# Patient Record
Sex: MALE | Race: WHITE | NOT HISPANIC OR LATINO | URBAN - METROPOLITAN AREA
[De-identification: names, ages, dates, MRNs, and addresses within clinical notes are randomized per-mention and may not be internally consistent; named-entity substitution may affect disease eponyms.]

---

## 2022-04-21 ENCOUNTER — OFFICE VISIT (OUTPATIENT)
Dept: URBAN - METROPOLITAN AREA CLINIC 68 | Facility: CLINIC | Age: 62
End: 2022-04-21

## 2022-06-04 ENCOUNTER — TELEPHONE ENCOUNTER (OUTPATIENT)
Dept: URBAN - METROPOLITAN AREA CLINIC 68 | Facility: CLINIC | Age: 62
End: 2022-06-04

## 2022-06-04 RX ORDER — SITAGLIPTIN AND METFORMIN HYDROCHLORIDE 50; 500 MG/1; MG/1
JANUMET( 50-500MG ORAL   ) UNDEFINED -HX ENTRY TABLET, FILM COATED ORAL
OUTPATIENT
Start: 2015-07-24

## 2022-06-04 RX ORDER — POLYETHYLENE GLYCOL 3350, SODIUM SULFATE, SODIUM CHLORIDE, POTASSIUM CHLORIDE, ASCORBIC ACID, SODIUM ASCORBATE 7.5-2.691G
KIT ORAL AS DIRECTED
Qty: 1 | Refills: 0 | OUTPATIENT
Start: 2012-07-11 | End: 2012-07-12

## 2022-06-04 RX ORDER — CANAGLIFLOZIN 300 MG/1
INVOKANA( 300MG ORAL   ) UNDEFINED -HX ENTRY TABLET, FILM COATED ORAL
OUTPATIENT
Start: 2015-07-24

## 2022-06-04 RX ORDER — LOSARTAN POTASSIUM 25 MG/1
LOSARTAN POTASSIUM( 25MG ORAL   ) UNDEFINED -HX ENTRY TABLET ORAL
OUTPATIENT
Start: 2015-07-24

## 2022-06-04 RX ORDER — LEVOTHYROXINE SODIUM 125 UG/1
TABLET ORAL
Qty: 30 | Refills: 0 | OUTPATIENT
Start: 2017-04-05 | End: 2017-05-05

## 2022-06-04 RX ORDER — FENOFIBRATE 134 MG/1
FENOFIBRATE MICRONIZED( 134MG ORAL   ) UNDEFINED -HX ENTRY CAPSULE ORAL
OUTPATIENT
Start: 2017-03-23

## 2022-06-04 RX ORDER — LEVOTHYROXINE SODIUM 125 UG/1
SYNTHROID( 125MCG ORAL   ) UNDEFINED -HX ENTRY TABLET ORAL
OUTPATIENT
Start: 2017-03-23

## 2022-06-05 ENCOUNTER — TELEPHONE ENCOUNTER (OUTPATIENT)
Dept: URBAN - METROPOLITAN AREA CLINIC 68 | Facility: CLINIC | Age: 62
End: 2022-06-05

## 2022-06-05 RX ORDER — SITAGLIPTIN AND METFORMIN HYDROCHLORIDE 50; 500 MG/1; MG/1
JANUMET( 50-500MG ORAL 1 TWO TIMES DAILY ) ACTIVE -HX ENTRY TABLET, FILM COATED ORAL
Status: ACTIVE | COMMUNITY
Start: 2017-04-05

## 2022-06-05 RX ORDER — CANAGLIFLOZIN 300 MG/1
INVOKANA( 300MG ORAL  DAILY ) ACTIVE -HX ENTRY TABLET, FILM COATED ORAL DAILY
Status: ACTIVE | COMMUNITY
Start: 2017-04-05

## 2022-06-05 RX ORDER — FENOFIBRATE 134 MG/1
FENOFIBRATE( 134MG ORAL  DAILY ) ACTIVE -HX ENTRY CAPSULE ORAL DAILY
Status: ACTIVE | COMMUNITY
Start: 2017-04-05

## 2022-06-05 RX ORDER — LOSARTAN POTASSIUM 25 MG/1
LOSARTAN POTASSIUM( 25MG ORAL  DAILY ) ACTIVE -HX ENTRY TABLET ORAL DAILY
Status: ACTIVE | COMMUNITY
Start: 2017-04-05

## 2022-06-05 RX ORDER — OMEPRAZOLE 20 MG/1
CAPSULE, DELAYED RELEASE ORAL
Qty: 30 | Refills: 30 | Status: ACTIVE | COMMUNITY
Start: 2021-05-09

## 2022-06-05 RX ORDER — UBIDECARENONE/VIT E ACET 100MG-5
VITAMIN D( 2000UNIT ORAL  DAILY ) ACTIVE -HX ENTRY CAPSULE ORAL DAILY
Status: ACTIVE | COMMUNITY
Start: 2017-04-05

## 2022-06-22 ENCOUNTER — OFFICE VISIT (OUTPATIENT)
Dept: URBAN - METROPOLITAN AREA CLINIC 68 | Facility: CLINIC | Age: 62
End: 2022-06-22

## 2022-06-25 ENCOUNTER — TELEPHONE ENCOUNTER (OUTPATIENT)
Age: 62
End: 2022-06-25

## 2022-06-25 RX ORDER — ELECTROLYTES/DEXTROSE
SOLUTION, ORAL ORAL
OUTPATIENT
Start: 2011-05-18 | End: 2017-04-05

## 2022-06-25 RX ORDER — CANAGLIFLOZIN 300 MG/1
INVOKANA( 300MG ORAL   ) UNDEFINED -HX ENTRY TABLET, FILM COATED ORAL
OUTPATIENT
Start: 2015-07-24

## 2022-06-25 RX ORDER — POLYETHYLENE GLYCOL 3350, SODIUM SULFATE, SODIUM CHLORIDE, POTASSIUM CHLORIDE, ASCORBIC ACID, SODIUM ASCORBATE 7.5-2.691G
KIT ORAL AS DIRECTED
Qty: 1 | Refills: 0 | OUTPATIENT
Start: 2012-07-11 | End: 2012-07-12

## 2022-06-25 RX ORDER — LEVOTHYROXINE SODIUM 125 UG/1
TABLET ORAL
Qty: 30 | Refills: 0 | OUTPATIENT
Start: 2017-04-05 | End: 2017-05-05

## 2022-06-25 RX ORDER — LEVOTHYROXINE SODIUM 125 MCG
SYNTHROID( 125MCG ORAL   ) UNDEFINED -HX ENTRY TABLET ORAL
OUTPATIENT
Start: 2017-03-23

## 2022-06-25 RX ORDER — SITAGLIPTIN AND METFORMIN HYDROCHLORIDE 500; 50 MG/1; MG/1
JANUMET( 50-500MG ORAL   ) UNDEFINED -HX ENTRY TABLET, FILM COATED ORAL
OUTPATIENT
Start: 2015-07-24

## 2022-06-25 RX ORDER — POTASSIUM CHLORIDE 750 MG/1
LOSARTAN POTASSIUM(    1 TABLET DAILY ) INACTIVE -HX ENTRY TABLET, EXTENDED RELEASE ORAL
OUTPATIENT
Start: 2011-05-18

## 2022-06-25 RX ORDER — OMEGA-3/DHA/EPA/FISH OIL 1000 MG
CAPSULE ORAL
OUTPATIENT
Start: 2011-05-18 | End: 2017-04-05

## 2022-06-25 RX ORDER — SODIUM SULFATE, POTASSIUM SULFATE, MAGNESIUM SULFATE 17.5; 3.13; 1.6 G/ML; G/ML; G/ML
SOLUTION, CONCENTRATE ORAL AS DIRECTED
Qty: 1 | Refills: 0 | OUTPATIENT
Start: 2017-04-05 | End: 2017-04-06

## 2022-06-25 RX ORDER — FENOFIBRATE 134 MG/1
FENOFIBRATE MICRONIZED( 134MG ORAL   ) UNDEFINED -HX ENTRY CAPSULE ORAL
OUTPATIENT
Start: 2017-03-23

## 2022-06-25 RX ORDER — LOSARTAN POTASSIUM 25 MG/1
LOSARTAN POTASSIUM( 25MG ORAL   ) UNDEFINED -HX ENTRY TABLET, FILM COATED ORAL
OUTPATIENT
Start: 2015-07-24

## 2022-06-25 RX ORDER — POLYETHYLENE GLYCOL 3350, SODIUM SULFATE, SODIUM CHLORIDE, POTASSIUM CHLORIDE, ASCORBIC ACID, SODIUM ASCORBATE 7.5-2.691G
MOVIPREP(    AS DIRECTED ) INACTIVE -HX ENTRY KIT ORAL AS DIRECTED
OUTPATIENT
Start: 2011-05-18

## 2022-06-26 ENCOUNTER — TELEPHONE ENCOUNTER (OUTPATIENT)
Age: 62
End: 2022-06-26

## 2022-06-26 RX ORDER — FENOFIBRATE 134 MG/1
FENOFIBRATE( 134MG ORAL  DAILY ) ACTIVE -HX ENTRY CAPSULE ORAL DAILY
Status: ACTIVE | COMMUNITY
Start: 2017-04-05

## 2022-06-26 RX ORDER — LOSARTAN POTASSIUM 25 MG/1
LOSARTAN POTASSIUM( 25MG ORAL  DAILY ) ACTIVE -HX ENTRY TABLET, FILM COATED ORAL DAILY
Status: ACTIVE | COMMUNITY
Start: 2017-04-05

## 2022-06-26 RX ORDER — OMEPRAZOLE 20 MG/1
CAPSULE, DELAYED RELEASE ORAL
Qty: 30 | Refills: 30 | Status: ACTIVE | COMMUNITY
Start: 2021-05-09

## 2022-06-26 RX ORDER — UBIDECARENONE/VIT E ACET 100MG-5
VITAMIN D( 2000UNIT ORAL  DAILY ) ACTIVE -HX ENTRY CAPSULE ORAL DAILY
Status: ACTIVE | COMMUNITY
Start: 2017-04-05

## 2022-06-26 RX ORDER — SITAGLIPTIN AND METFORMIN HYDROCHLORIDE 500; 50 MG/1; MG/1
JANUMET( 50-500MG ORAL 1 TWO TIMES DAILY ) ACTIVE -HX ENTRY TABLET, FILM COATED ORAL
Status: ACTIVE | COMMUNITY
Start: 2017-04-05

## 2022-06-26 RX ORDER — CANAGLIFLOZIN 300 MG/1
INVOKANA( 300MG ORAL  DAILY ) ACTIVE -HX ENTRY TABLET, FILM COATED ORAL DAILY
Status: ACTIVE | COMMUNITY
Start: 2017-04-05

## 2022-06-26 RX ORDER — ASPIRIN 81 MG/1
TABLET, COATED ORAL
Status: ACTIVE | COMMUNITY
Start: 2011-05-18

## 2022-07-27 ENCOUNTER — OFFICE VISIT (OUTPATIENT)
Dept: URBAN - METROPOLITAN AREA CLINIC 68 | Facility: CLINIC | Age: 62
End: 2022-07-27

## 2022-07-27 ENCOUNTER — DASHBOARD ENCOUNTERS (OUTPATIENT)
Age: 62
End: 2022-07-27

## 2022-07-27 RX ORDER — SOD SULF/POT CHLORIDE/MAG SULF 1.479 G
AS DIRECTED TABLET ORAL ONCE
Qty: 1 KIT | OUTPATIENT
Start: 2022-07-27 | End: 2022-07-28

## 2022-07-27 RX ORDER — CANAGLIFLOZIN 300 MG/1
INVOKANA( 300MG ORAL  DAILY ) ACTIVE -HX ENTRY TABLET, FILM COATED ORAL DAILY
Status: DISCONTINUED | COMMUNITY
Start: 2017-04-05

## 2022-07-27 RX ORDER — NIACIN 500 MG
1 TABLET WITH FOOD TABLET ORAL ONCE A DAY
Status: ACTIVE | COMMUNITY

## 2022-07-27 RX ORDER — SITAGLIPTIN AND METFORMIN HYDROCHLORIDE 50; 500 MG/1; MG/1
JANUMET( 50-500MG ORAL 1 TWO TIMES DAILY ) ACTIVE -HX ENTRY TABLET, FILM COATED ORAL
Status: DISCONTINUED | COMMUNITY
Start: 2017-04-05

## 2022-07-27 RX ORDER — UBIDECARENONE/VIT E ACET 100MG-5
VITAMIN D( 2000UNIT ORAL  DAILY ) ACTIVE -HX ENTRY CAPSULE ORAL DAILY
Status: ACTIVE | COMMUNITY
Start: 2017-04-05

## 2022-07-27 RX ORDER — LEVOTHYROXINE SODIUM 125 UG/1
1 CAPSULE IN THE MORNING ON AN EMPTY STOMACH CAPSULE ORAL ONCE A DAY
Status: ACTIVE | COMMUNITY

## 2022-07-27 RX ORDER — ICOSAPENT ETHYL 1 G/1
2 CAPSULES WITH MEALS CAPSULE ORAL TWICE A DAY
Status: ACTIVE | COMMUNITY

## 2022-07-27 RX ORDER — FENOFIBRATE 134 MG/1
FENOFIBRATE( 134MG ORAL  DAILY ) ACTIVE -HX ENTRY CAPSULE ORAL DAILY
Status: ACTIVE | COMMUNITY
Start: 2017-04-05

## 2022-07-27 RX ORDER — LOSARTAN POTASSIUM 25 MG/1
LOSARTAN POTASSIUM( 25MG ORAL  DAILY ) ACTIVE -HX ENTRY TABLET ORAL DAILY
Status: ACTIVE | COMMUNITY
Start: 2017-04-05

## 2022-07-27 RX ORDER — METFORMIN HYDROCHLORIDE 500 MG/1
1 TABLET WITH A MEAL TABLET, FILM COATED ORAL ONCE A DAY
Status: ACTIVE | COMMUNITY

## 2022-07-27 RX ORDER — OMEPRAZOLE 20 MG/1
CAPSULE, DELAYED RELEASE ORAL
Qty: 30 | Refills: 30 | Status: ACTIVE | COMMUNITY
Start: 2021-05-09

## 2022-07-27 NOTE — HPI-MIGRATED HPI
Transition to Care : 62 y.o. WM with history of colon polyps and family history of colon cancer who is here for a surveillance colonoscopy. He is s/p an EGD and colonoscopy in 2017 which showed a normal colonoscopy and EGD showed a small hiatal hernia, mild gatritis, benign esophageal stenosis dilated with 16mm Savary. He denies any dysphagia, no abdominal pain, no n/v, no gib. He has doing well since taking Omeprazole 20 mg/d.

## 2022-07-27 NOTE — EXAM-MIGRATED EXAMINATIONS
General Examination: General appearance: -> alert, pleasant, well-nourished and in no acute distress   Head: -> normocephalic, atraumatic   Eyes: -> normal   Neck / thyroid: -> neck is supple, with full range of motion and no cervical lymphadenopathy   Skin: -> skin is warm and dry, with no rashes, good skin turgor and normal hair distribution   Heart: -> regular rate and rhythm without murmurs, gallops, clicks or rubs   Lungs: -> clear to auscultation bilaterally, with good air movement and no rales, rhonchi or wheezes   Chest: -> chest wall with no costochondral junction tenderness, no rib deformity and normal shape and expansion   Abdomen: -> soft with good bowel sounds, nontender, and no masses or hepatosplenomegaly , negative Jean-Baptiste's sign   Extremities: -> normal extremity with no clubbing, cyanosis or edema   Neurologic: -> alert and oriented

## 2022-09-13 ENCOUNTER — OFFICE VISIT (OUTPATIENT)
Dept: URBAN - METROPOLITAN AREA SURGERY CENTER 12 | Facility: SURGERY CENTER | Age: 62
End: 2022-09-13